# Patient Record
Sex: FEMALE | Race: BLACK OR AFRICAN AMERICAN | NOT HISPANIC OR LATINO | Employment: PART TIME | URBAN - METROPOLITAN AREA
[De-identification: names, ages, dates, MRNs, and addresses within clinical notes are randomized per-mention and may not be internally consistent; named-entity substitution may affect disease eponyms.]

---

## 2021-11-24 ENCOUNTER — OFFICE VISIT (OUTPATIENT)
Dept: OBSTETRICS AND GYNECOLOGY | Facility: CLINIC | Age: 31
End: 2021-11-24

## 2021-11-24 ENCOUNTER — PROCEDURE VISIT (OUTPATIENT)
Dept: OBSTETRICS AND GYNECOLOGY | Facility: CLINIC | Age: 31
End: 2021-11-24

## 2021-11-24 VITALS
WEIGHT: 166 LBS | SYSTOLIC BLOOD PRESSURE: 136 MMHG | BODY MASS INDEX: 28.34 KG/M2 | DIASTOLIC BLOOD PRESSURE: 74 MMHG | HEIGHT: 64 IN

## 2021-11-24 DIAGNOSIS — Z53.09: Primary | ICD-10-CM

## 2021-11-24 DIAGNOSIS — N75.0 BARTHOLIN'S CYST: Primary | ICD-10-CM

## 2021-11-24 PROCEDURE — 56420 INCISION AND DRAINAGE: ICD-10-PCS | Mod: S$GLB,,, | Performed by: NURSE PRACTITIONER

## 2021-11-24 PROCEDURE — 99499 UNLISTED E&M SERVICE: CPT | Mod: S$GLB,,, | Performed by: NURSE PRACTITIONER

## 2021-11-24 PROCEDURE — 56420 I&D BARTHOLINS GLAND ABSCESS: CPT | Mod: S$GLB,,, | Performed by: NURSE PRACTITIONER

## 2021-11-24 PROCEDURE — 99499 NO LOS: ICD-10-PCS | Mod: S$GLB,,, | Performed by: NURSE PRACTITIONER

## 2021-11-24 RX ORDER — CLINDAMYCIN HYDROCHLORIDE 300 MG/1
300 CAPSULE ORAL 2 TIMES DAILY
Qty: 14 CAPSULE | Refills: 0 | Status: SHIPPED | OUTPATIENT
Start: 2021-11-24 | End: 2022-05-26 | Stop reason: SDUPTHER

## 2021-12-14 ENCOUNTER — OFFICE VISIT (OUTPATIENT)
Dept: OBSTETRICS AND GYNECOLOGY | Facility: CLINIC | Age: 31
End: 2021-12-14

## 2021-12-14 VITALS
SYSTOLIC BLOOD PRESSURE: 100 MMHG | BODY MASS INDEX: 29.02 KG/M2 | DIASTOLIC BLOOD PRESSURE: 60 MMHG | HEIGHT: 64 IN | WEIGHT: 170 LBS

## 2021-12-14 DIAGNOSIS — Z00.00 WELLNESS EXAMINATION: Primary | ICD-10-CM

## 2021-12-14 DIAGNOSIS — Z11.3 SCREENING FOR STD (SEXUALLY TRANSMITTED DISEASE): ICD-10-CM

## 2021-12-14 PROCEDURE — 99395 PREV VISIT EST AGE 18-39: CPT | Mod: S$GLB,,, | Performed by: NURSE PRACTITIONER

## 2021-12-14 PROCEDURE — 88175 CYTOPATH C/V AUTO FLUID REDO: CPT | Performed by: NURSE PRACTITIONER

## 2021-12-14 PROCEDURE — 87591 N.GONORRHOEAE DNA AMP PROB: CPT | Performed by: NURSE PRACTITIONER

## 2021-12-14 PROCEDURE — 99395 PR PREVENTIVE VISIT,EST,18-39: ICD-10-PCS | Mod: S$GLB,,, | Performed by: NURSE PRACTITIONER

## 2021-12-14 PROCEDURE — 87491 CHLMYD TRACH DNA AMP PROBE: CPT | Performed by: NURSE PRACTITIONER

## 2021-12-14 RX ORDER — CLINDAMYCIN HYDROCHLORIDE 300 MG/1
300 CAPSULE ORAL 2 TIMES DAILY
Qty: 14 CAPSULE | Refills: 1 | Status: SHIPPED | OUTPATIENT
Start: 2021-12-14 | End: 2022-05-26 | Stop reason: SDUPTHER

## 2021-12-21 LAB
C TRACH DNA SPEC QL NAA+PROBE: NOT DETECTED
N GONORRHOEA DNA SPEC QL NAA+PROBE: NOT DETECTED

## 2021-12-22 LAB
FINAL PATHOLOGIC DIAGNOSIS: NORMAL
Lab: NORMAL

## 2021-12-28 ENCOUNTER — LAB VISIT (OUTPATIENT)
Dept: LAB | Facility: CLINIC | Age: 31
End: 2021-12-28

## 2021-12-28 DIAGNOSIS — Z00.00 WELLNESS EXAMINATION: ICD-10-CM

## 2021-12-28 DIAGNOSIS — Z11.3 SCREENING FOR STD (SEXUALLY TRANSMITTED DISEASE): ICD-10-CM

## 2021-12-28 LAB
CHOLEST SERPL-MCNC: 173 MG/DL (ref 120–199)
CHOLEST/HDLC SERPL: 3.4 {RATIO} (ref 2–5)
GLUCOSE SERPL-MCNC: 95 MG/DL (ref 70–110)
HDLC SERPL-MCNC: 51 MG/DL (ref 40–75)
HDLC SERPL: 29.5 % (ref 20–50)
LDLC SERPL CALC-MCNC: 113.6 MG/DL (ref 63–159)
NONHDLC SERPL-MCNC: 122 MG/DL
TRIGL SERPL-MCNC: 42 MG/DL (ref 30–150)

## 2021-12-28 PROCEDURE — 82947 ASSAY GLUCOSE BLOOD QUANT: CPT | Performed by: NURSE PRACTITIONER

## 2021-12-28 PROCEDURE — 87340 HEPATITIS B SURFACE AG IA: CPT | Performed by: NURSE PRACTITIONER

## 2021-12-28 PROCEDURE — 87389 HIV-1 AG W/HIV-1&-2 AB AG IA: CPT | Performed by: NURSE PRACTITIONER

## 2021-12-28 PROCEDURE — 36415 PR COLLECTION VENOUS BLOOD,VENIPUNCTURE: ICD-10-PCS | Mod: ,,, | Performed by: NURSE PRACTITIONER

## 2021-12-28 PROCEDURE — 36415 COLL VENOUS BLD VENIPUNCTURE: CPT | Mod: ,,, | Performed by: NURSE PRACTITIONER

## 2021-12-28 PROCEDURE — 86592 SYPHILIS TEST NON-TREP QUAL: CPT | Performed by: NURSE PRACTITIONER

## 2021-12-28 PROCEDURE — 80061 LIPID PANEL: CPT | Performed by: NURSE PRACTITIONER

## 2021-12-29 LAB
HBV SURFACE AG SERPL QL IA: NEGATIVE
HIV 1+2 AB+HIV1 P24 AG SERPL QL IA: NEGATIVE
RPR SER QL: NORMAL

## 2022-05-26 ENCOUNTER — OFFICE VISIT (OUTPATIENT)
Dept: OBSTETRICS AND GYNECOLOGY | Facility: CLINIC | Age: 32
End: 2022-05-26

## 2022-05-26 ENCOUNTER — TELEPHONE (OUTPATIENT)
Dept: OBSTETRICS AND GYNECOLOGY | Facility: CLINIC | Age: 32
End: 2022-05-26

## 2022-05-26 VITALS
DIASTOLIC BLOOD PRESSURE: 80 MMHG | WEIGHT: 160 LBS | HEIGHT: 64 IN | BODY MASS INDEX: 27.31 KG/M2 | SYSTOLIC BLOOD PRESSURE: 140 MMHG

## 2022-05-26 DIAGNOSIS — N75.1 ABSCESS OF LEFT BARTHOLIN'S GLAND: Primary | ICD-10-CM

## 2022-05-26 PROCEDURE — 99213 PR OFFICE/OUTPT VISIT, EST, LEVL III, 20-29 MIN: ICD-10-PCS | Mod: S$GLB,,, | Performed by: NURSE PRACTITIONER

## 2022-05-26 PROCEDURE — 99213 OFFICE O/P EST LOW 20 MIN: CPT | Mod: S$GLB,,, | Performed by: NURSE PRACTITIONER

## 2022-05-26 RX ORDER — CLINDAMYCIN HYDROCHLORIDE 300 MG/1
300 CAPSULE ORAL 2 TIMES DAILY
Qty: 14 CAPSULE | Refills: 0 | Status: SHIPPED | OUTPATIENT
Start: 2022-05-26 | End: 2023-01-18 | Stop reason: SDUPTHER

## 2022-05-26 NOTE — PROGRESS NOTES
Subjective:       Patient ID: Aparna Da Silva is a 31 y.o. female.    Chief Complaint: Cyst      HPI   Patient presents today for left labial Bartholin's gland cyst.  These are recurring for her.  Clindamycin usually helps and resolve the issue.  On several occasions I had to do an incision and drainage for relief.  Today patient states the left labia enlarged rapidly today.  The area does appear to be cellulitic.  Patient denies any fevers today.  No distinct cyst or nodule noted in order to drain today.  I have spoke with the patient in the past regarding possible surgical evaluation for marsupialization.  Patient does agree now for evaluation with MD  Review of Systems   Constitutional: Negative for appetite change, chills, fatigue and fever.   HENT: Negative for nasal congestion, rhinorrhea and sore throat.    Respiratory: Negative for cough, chest tightness, shortness of breath and wheezing.    Cardiovascular: Negative for chest pain, palpitations and leg swelling.   Gastrointestinal: Negative for abdominal distention, abdominal pain, blood in stool, constipation, diarrhea, nausea and vomiting.   Endocrine: Negative for cold intolerance, heat intolerance and polyuria.   Genitourinary: Positive for vaginal pain. Negative for bladder incontinence, dyspareunia, dysuria, flank pain, hot flashes, menstrual irregularity, pelvic pain, urgency, vaginal bleeding and vaginal discharge.   Musculoskeletal: Negative for arthralgias, myalgias and neck pain.   Integumentary:  Negative for rash, breast mass, breast discharge and breast tenderness.   Neurological: Negative for dizziness, syncope and headaches.   Psychiatric/Behavioral: Negative for agitation, sleep disturbance and suicidal ideas.   Breast: Negative for mass and tenderness        Objective:      Physical Exam  Exam conducted with a chaperone present.   Constitutional:       General: She is not in acute distress.     Appearance: Normal appearance. She is not  ill-appearing.   HENT:      Head: Normocephalic.      Nose: Nose normal.   Pulmonary:      Effort: Pulmonary effort is normal. No respiratory distress.   Abdominal:      General: Bowel sounds are normal. There is no distension.      Palpations: Abdomen is soft. There is no mass.      Tenderness: There is no abdominal tenderness.   Genitourinary:      Musculoskeletal:      Right lower leg: No edema.      Left lower leg: No edema.   Skin:     General: Skin is warm and dry.      Findings: No lesion.   Neurological:      General: No focal deficit present.      Mental Status: She is alert and oriented to person, place, and time.      Motor: No weakness.      Gait: Gait normal.   Psychiatric:         Mood and Affect: Mood normal.         Behavior: Behavior normal.         Thought Content: Thought content normal.         Judgment: Judgment normal.         Assessment:       Problem List Items Addressed This Visit    None     Visit Diagnoses     Abscess of left Bartholin's gland    -  Primary          Plan:       Abscess of left Bartholin's gland    Other orders  -     clindamycin (CLEOCIN) 300 MG capsule; Take 1 capsule (300 mg total) by mouth 2 (two) times daily.  Dispense: 14 capsule; Refill: 0    Clindamycin today.  Report to ER if fever  Walked continue warm Sitz baths  Follow-up early next week with MD.

## 2022-05-26 NOTE — LETTER
May 26, 2022      [facility]  [address] [address] [place] [address]  Phone: [phone]  Fax: [phone]       Patient: Aparna Da Silva   YOB: 1990  Date of Visit: 05/26/2022    To Whom It May Concern:    Yashira Da Silva  was at [facility] on 05/26/2022. The patient may return to work/school on 5/31/2022 with no restrictions. If you have any questions or concerns, or if I can be of further assistance, please do not hesitate to contact me.    Sincerely,    JAMAAL Us

## 2022-05-26 NOTE — TELEPHONE ENCOUNTER
Per Estela/Dr. Martel, Ultram 50mg 1 PO BID #14, 0RF called in to patient's pharmacy. Pt notified and LEONIDAS

## 2022-05-30 ENCOUNTER — OFFICE VISIT (OUTPATIENT)
Dept: OBSTETRICS AND GYNECOLOGY | Facility: CLINIC | Age: 32
End: 2022-05-30

## 2022-05-30 VITALS
DIASTOLIC BLOOD PRESSURE: 64 MMHG | WEIGHT: 160.81 LBS | BODY MASS INDEX: 27.45 KG/M2 | HEIGHT: 64 IN | SYSTOLIC BLOOD PRESSURE: 112 MMHG

## 2022-05-30 DIAGNOSIS — N75.0 BARTHOLIN'S CYST: Primary | ICD-10-CM

## 2022-05-30 DIAGNOSIS — N75.1 ABSCESS OF LEFT BARTHOLIN'S GLAND: ICD-10-CM

## 2022-05-30 PROCEDURE — 99213 PR OFFICE/OUTPT VISIT, EST, LEVL III, 20-29 MIN: ICD-10-PCS | Mod: S$GLB,,, | Performed by: OBSTETRICS & GYNECOLOGY

## 2022-05-30 PROCEDURE — 99213 OFFICE O/P EST LOW 20 MIN: CPT | Mod: S$GLB,,, | Performed by: OBSTETRICS & GYNECOLOGY

## 2022-05-30 RX ORDER — TRAMADOL HYDROCHLORIDE 50 MG/1
50 TABLET ORAL 2 TIMES DAILY
COMMUNITY
Start: 2022-05-26

## 2022-05-30 RX ORDER — ERYTHROMYCIN 5 MG/G
OINTMENT OPHTHALMIC
COMMUNITY
Start: 2022-02-28

## 2022-05-30 RX ORDER — KETOROLAC TROMETHAMINE 10 MG/1
10 TABLET, FILM COATED ORAL EVERY 6 HOURS PRN
COMMUNITY
Start: 2022-02-28

## 2022-05-30 NOTE — PROGRESS NOTES
Patient ID: Aparna Da Silva is a 31 y.o. female.    Chief Complaint:  Follow-up (Pt is here for a follow up on abscess Barthlin's gland)      History of Present Illness  HPI  Or patient recently diagnosed with right-sided Bartholin's cyst causing significant cellulitis of her right labia.  She was recently started on clindamycin.  Patient states the cellulitis has improved as well has the pain however she still has significant edema and cyst on her right labia.  She states she has a Bartholin's cyst on her left labia as well however is not painful.  She has had recurrent Bartholin's cyst abscess monthly for the last 6 months.  There was a time when she went several months without an infection however the cyst remained.  She has gone through multiple I&Ds and antibiotics and now desires further treatment.  She is here for follow-up as well for her cellulitis of the most recent Bartholin's cyst abscess.  She denies any nausea or vomiting.  She states she is taking her medications without complication.    GYN & OB History  Patient's last menstrual period was 2022 (exact date).   Date of Last Pap: 2021    History reviewed. No pertinent past medical history.  Past Surgical History:   Procedure Laterality Date    WISDOM TOOTH EXTRACTION       Review of patient's allergies indicates:  No Known Allergies  Current Outpatient Medications on File Prior to Visit   Medication Sig Dispense Refill    clindamycin (CLEOCIN) 300 MG capsule Take 1 capsule (300 mg total) by mouth 2 (two) times daily. 14 capsule 0    erythromycin (ROMYCIN) ophthalmic ointment Place a 1/2 inch ribbon of ointment into the lower eyelid.      ketorolac (TORADOL) 10 mg tablet Take 10 mg by mouth every 6 (six) hours as needed.      traMADoL (ULTRAM) 50 mg tablet Take 50 mg by mouth 2 (two) times daily.       No current facility-administered medications on file prior to visit.       OB History    Para Term  AB Living   3 1 1   " 2 1   SAB IAB Ectopic Multiple Live Births   1       1      # Outcome Date GA Lbr Derick/2nd Weight Sex Delivery Anes PTL Lv   3 Term 12/08/11   3.345 kg (7 lb 6 oz) F Vag-Spont EPI  DENISA      Complications: Shoulder Dystocia   2 AB            1 SAB                Review of Systems  Review of Systems   Constitutional: Negative for fatigue, fever and unexpected weight change.   HENT: Negative for nasal congestion.    Respiratory: Negative for cough and shortness of breath.    Cardiovascular: Negative for chest pain, palpitations and leg swelling.   Gastrointestinal: Negative for abdominal pain, constipation, diarrhea, nausea, vomiting and reflux.   Endocrine: Negative for diabetes, hair loss and hot flashes.   Genitourinary: Positive for vaginal pain. Negative for bladder incontinence, decreased libido, dysmenorrhea, dyspareunia, dysuria, hot flashes, menorrhagia, menstrual problem, pelvic pain, vaginal discharge and vaginal dryness.   Musculoskeletal: Negative for arthralgias, back pain and myalgias.   Integumentary:  Negative for rash, acne, hair changes, mole/lesion, breast mass, nipple discharge, breast skin changes and breast tenderness.   Neurological: Negative for seizures, syncope and headaches.   Hematological: Negative for adenopathy. Does not bruise/bleed easily.   Psychiatric/Behavioral: Negative for depression and sleep disturbance. The patient is not nervous/anxious.    Breast: Negative for breast self exam, lump, mass, mastodynia, nipple discharge, skin changes and tenderness              Objective:   /64   Ht 5' 4" (1.626 m)   Wt 72.9 kg (160 lb 12.8 oz)   LMP 05/08/2022 (Exact Date)   BMI 27.60 kg/m²        Physical Exam:   Constitutional: She is oriented to person, place, and time. She appears well-developed and well-nourished. No distress.    HENT:   Head: Normocephalic and atraumatic.   Nose: Nose normal.            Abdominal: Soft. She exhibits no distension and no mass. There is no " abdominal tenderness.             Musculoskeletal: Normal range of motion and moves all extremeties. No edema.       Neurological: She is alert and oriented to person, place, and time. No cranial nerve deficit.     Psychiatric: She has a normal mood and affect. Her behavior is normal. Judgment and thought content normal.   :  Mild edema of the right labia minora and majora.  Significant firmness noted in the Bartholin's area consistent with Bartholin's cyst.  Left-sided Bartholin's cyst noted.  Not tender to palpation or infected.  Right side is tender to palpation.  No discharge.        Assessment:      1. Bartholin's cyst    2. Abscess of left Bartholin's gland          Plan:           patient with recurrent Bartholin's cyst and Bartholin cyst abscesses.  Current cellulitis and abscess reports improving with antibiotics.  Patient states the recurrent abscesses and cysts are affecting her daily life and she desires further treatment.  Discussed options of suppression dosing versus marsupialization and patient elects for surgical treatment with bilateral Bartholin's cyst marsupialization.  Will schedule and have patient return to preop.  Patient agrees expressed understanding.

## 2022-06-01 ENCOUNTER — TELEPHONE (OUTPATIENT)
Dept: OBSTETRICS AND GYNECOLOGY | Facility: CLINIC | Age: 32
End: 2022-06-01

## 2022-06-01 NOTE — TELEPHONE ENCOUNTER
Called Pt to discuss surgery. Pt will be contacted by [employer] for estimates. She will call me once this is done so she can be given information for [employer] contacts to discuss their self pay prices. Pt aware once she has done this and is ready to proceed with scheduling to let me know. Pt verbalized understanding.

## 2023-01-18 ENCOUNTER — OFFICE VISIT (OUTPATIENT)
Dept: OBSTETRICS AND GYNECOLOGY | Facility: CLINIC | Age: 33
End: 2023-01-18
Payer: COMMERCIAL

## 2023-01-18 ENCOUNTER — TELEPHONE (OUTPATIENT)
Dept: OBSTETRICS AND GYNECOLOGY | Facility: CLINIC | Age: 33
End: 2023-01-18

## 2023-01-18 VITALS
DIASTOLIC BLOOD PRESSURE: 56 MMHG | WEIGHT: 155 LBS | HEIGHT: 65 IN | SYSTOLIC BLOOD PRESSURE: 102 MMHG | BODY MASS INDEX: 25.83 KG/M2

## 2023-01-18 DIAGNOSIS — N75.0 BARTHOLIN'S CYST: Primary | ICD-10-CM

## 2023-01-18 PROCEDURE — 56420 I&D BARTHOLINS GLAND ABSCESS: CPT | Mod: S$GLB,,, | Performed by: NURSE PRACTITIONER

## 2023-01-18 PROCEDURE — 56420 INCISION AND DRAINAGE: ICD-10-PCS | Mod: S$GLB,,, | Performed by: NURSE PRACTITIONER

## 2023-01-18 RX ORDER — CLINDAMYCIN HYDROCHLORIDE 300 MG/1
300 CAPSULE ORAL 2 TIMES DAILY
Qty: 14 CAPSULE | Refills: 0 | Status: SHIPPED | OUTPATIENT
Start: 2023-01-18

## 2023-01-18 RX ORDER — AMOXICILLIN 875 MG/1
875 TABLET, FILM COATED ORAL
COMMUNITY

## 2023-01-18 NOTE — PROCEDURES
INCISION AND DRAINAGE    Date/Time: 1/18/2023 10:30 AM  Performed by: Estela Flores NP  Authorized by: Estela Flores NP     Consent Done?:  Yes (Written)    Type:  Cyst  Body area:  Anogenital  Location details:  Bartholin's gland  Anesthesia:  Local infiltration  Local anesthetic: Lidocaine 1% with epinephrine  Scalpel size:  11  Incision type:  Single straight  Incision depth: subcutaneous    Complexity:  Simple  Drainage:  Purulent and bloody  Drainage amount:  Moderate  Wound treatment:  Wound left open  Packing material:  None  Patient tolerance:  Patient tolerated the procedure well with no immediate complications    Antibiotics prescribed.  Follow-up this Friday for consideration of marsupialization

## 2023-01-18 NOTE — PROGRESS NOTES
Subjective:       Patient ID: Aparna Da Silva is a 32 y.o. female.    Chief Complaint: Cyst      HPI     Review of Systems      Objective:      Physical Exam    Assessment:       Problem List Items Addressed This Visit    None      Plan:       There are no diagnoses linked to this encounter.

## 2023-01-20 ENCOUNTER — OFFICE VISIT (OUTPATIENT)
Dept: OBSTETRICS AND GYNECOLOGY | Facility: CLINIC | Age: 33
End: 2023-01-20
Payer: COMMERCIAL

## 2023-01-20 VITALS
HEIGHT: 64 IN | SYSTOLIC BLOOD PRESSURE: 112 MMHG | BODY MASS INDEX: 26.53 KG/M2 | WEIGHT: 155.38 LBS | DIASTOLIC BLOOD PRESSURE: 58 MMHG

## 2023-01-20 DIAGNOSIS — N75.0 BARTHOLIN'S CYST: ICD-10-CM

## 2023-01-20 DIAGNOSIS — N75.1 ABSCESS OF RIGHT BARTHOLIN'S GLAND: Primary | ICD-10-CM

## 2023-01-20 PROCEDURE — 99213 OFFICE O/P EST LOW 20 MIN: CPT | Mod: S$GLB,,, | Performed by: OBSTETRICS & GYNECOLOGY

## 2023-01-20 PROCEDURE — 99213 PR OFFICE/OUTPT VISIT, EST, LEVL III, 20-29 MIN: ICD-10-PCS | Mod: S$GLB,,, | Performed by: OBSTETRICS & GYNECOLOGY

## 2023-01-20 NOTE — PROGRESS NOTES
Patient ID: Aparna Da Silva is a 32 y.o. female.    Chief Complaint:  Consult (Pt is here for a Bartholin's cyst surgical consult visit. Pt was referred by NP Estela Mendoza.)      History of Present Illness  HPI  Patient recently treated for Bartholin's gland abscess.  She underwent I&D recently and states she has had multiple Bartholin's gland abscesses on her right side.  She states they occur once every 2-3 months.  She is had greater than 5 of these occur.  She also has had a Bartholin's gland abscess on her left side.  She still has a cyst on the left side however is no pain.  After her recent incision and drainage her pain is doing much better and it is draining.  Has gone down in size.  She is here to discuss potential long-term treatment due to her recurrent Bartholin's gland abscesses.  She denies any history of diabetes or other medical problems.  She is doing well otherwise.  She states typically she tries antibiotics 1st they do not resolve and so she has to undergo an incision and drainage to relieve the pain from her abscesses.  Patient states she is currently on a very heavy day of her menstrual cycle and would like to discuss potential long-term treatment.    GYN & OB History  Patient's last menstrual period was 01/19/2023.   Date of Last Pap: 12/22/2021    History reviewed. No pertinent past medical history.  Past Surgical History:   Procedure Laterality Date    WISDOM TOOTH EXTRACTION       Review of patient's allergies indicates:  No Known Allergies  Current Outpatient Medications on File Prior to Visit   Medication Sig Dispense Refill    amoxicillin (AMOXIL) 875 MG tablet Take 875 mg by mouth every 12 (twelve) hours.      clindamycin (CLEOCIN) 300 MG capsule Take 1 capsule (300 mg total) by mouth 2 (two) times daily. 14 capsule 0    erythromycin (ROMYCIN) ophthalmic ointment Place a 1/2 inch ribbon of ointment into the lower eyelid.      ketorolac (TORADOL) 10 mg tablet Take 10 mg by mouth  "every 6 (six) hours as needed.      traMADoL (ULTRAM) 50 mg tablet Take 50 mg by mouth 2 (two) times daily.       No current facility-administered medications on file prior to visit.       OB History    Para Term  AB Living   3 1 1   2 1   SAB IAB Ectopic Multiple Live Births   1       1      # Outcome Date GA Lbr Derick/2nd Weight Sex Delivery Anes PTL Lv   3 Term 11   3.345 kg (7 lb 6 oz) F Vag-Spont EPI  DENISA      Complications: Shoulder Dystocia   2 AB            1 SAB                Review of Systems  Review of Systems   Constitutional:  Negative for fatigue, fever and unexpected weight change.   HENT:  Negative for nasal congestion.    Respiratory:  Negative for cough and shortness of breath.    Cardiovascular:  Negative for chest pain, palpitations and leg swelling.   Gastrointestinal:  Negative for abdominal pain, constipation, diarrhea, nausea, vomiting and reflux.   Endocrine: Negative for diabetes, hair loss and hot flashes.   Genitourinary:  Positive for genital sores. Negative for bladder incontinence, decreased libido, dysmenorrhea, dyspareunia, dysuria, hot flashes, menorrhagia, menstrual problem, pelvic pain, vaginal discharge and vaginal dryness.   Musculoskeletal:  Negative for arthralgias, back pain and myalgias.   Integumentary:  Negative for rash, acne, hair changes, mole/lesion, breast mass, nipple discharge, breast skin changes and breast tenderness.   Neurological:  Negative for seizures, syncope and headaches.   Hematological:  Negative for adenopathy. Does not bruise/bleed easily.   Psychiatric/Behavioral:  Negative for depression and sleep disturbance. The patient is not nervous/anxious.    Breast: Negative for breast self exam, lump, mass, mastodynia, nipple discharge, skin changes and tenderness            Objective:   BP (!) 112/58   Ht 5' 4" (1.626 m)   Wt 70.5 kg (155 lb 6.4 oz)   LMP 2023   BMI 26.67 kg/m²        Physical Exam:   Constitutional: She is " oriented to person, place, and time. She appears well-developed and well-nourished. No distress.    HENT:   Head: Normocephalic and atraumatic.   Nose: Nose normal.            Abdominal: Soft. She exhibits no distension and no mass. There is no abdominal tenderness.             Musculoskeletal: Normal range of motion and moves all extremeties. No edema.       Neurological: She is alert and oriented to person, place, and time. No cranial nerve deficit.     Psychiatric: She has a normal mood and affect. Her behavior is normal. Judgment and thought content normal.          Assessment:      1. Abscess of right Bartholin's gland    2. Bartholin's cyst          Plan:      Discussed current treatment a Bartholin's gland abscess since pain is improving and cyst is going down is working.  Discussed long-term treatment regarding marsupialization versus excision Bartholin's gland cyst.  Patient would like to move forward with excision of the cyst.  She would like removal of bilateral cysts to decrease the chance of recurrence.  Risks/benefits/alternatives of procedure were discussed

## 2023-01-23 ENCOUNTER — OFFICE VISIT (OUTPATIENT)
Dept: OBSTETRICS AND GYNECOLOGY | Facility: CLINIC | Age: 33
End: 2023-01-23
Payer: COMMERCIAL

## 2023-01-23 ENCOUNTER — TELEPHONE (OUTPATIENT)
Dept: OBSTETRICS AND GYNECOLOGY | Facility: CLINIC | Age: 33
End: 2023-01-23
Payer: COMMERCIAL

## 2023-01-23 VITALS
BODY MASS INDEX: 25.83 KG/M2 | WEIGHT: 155 LBS | HEIGHT: 65 IN | SYSTOLIC BLOOD PRESSURE: 108 MMHG | DIASTOLIC BLOOD PRESSURE: 66 MMHG

## 2023-01-23 DIAGNOSIS — N75.1 ABSCESS OF RIGHT BARTHOLIN'S GLAND: Primary | ICD-10-CM

## 2023-01-23 PROCEDURE — 99024 PR POST-OP FOLLOW-UP VISIT: ICD-10-PCS | Mod: S$GLB,,, | Performed by: NURSE PRACTITIONER

## 2023-01-23 PROCEDURE — 99024 POSTOP FOLLOW-UP VISIT: CPT | Mod: S$GLB,,, | Performed by: NURSE PRACTITIONER

## 2023-01-23 NOTE — PROGRESS NOTES
Subjective:       Patient ID: Aparna Da Silva is a 32 y.o. female.    Chief Complaint: Follow-up (Incision and Drainage follow up)      HPI   Presents today for follow-up of Bartholin gland cyst status post I and D last week.  She denies any fever.  She was evaluated by Dr. Martel for marsupialization of both glands potentially.  Patient is scheduled for procedure.  Review of Systems   Constitutional:  Negative for appetite change, chills, fatigue and fever.   HENT:  Negative for nasal congestion, rhinorrhea and sore throat.    Respiratory:  Negative for cough, chest tightness, shortness of breath and wheezing.    Cardiovascular:  Negative for chest pain, palpitations and leg swelling.   Gastrointestinal:  Negative for abdominal distention, abdominal pain, blood in stool, constipation, diarrhea, nausea and vomiting.   Endocrine: Negative for cold intolerance, heat intolerance and polyuria.   Genitourinary:  Negative for bladder incontinence, dyspareunia, dysuria, flank pain, hot flashes, menstrual irregularity, pelvic pain, urgency, vaginal bleeding and vaginal discharge.        Status post Bartholin cyst I&D   Musculoskeletal:  Negative for arthralgias, myalgias and neck pain.   Integumentary:  Negative for rash, breast mass, breast discharge and breast tenderness.   Neurological:  Negative for dizziness, syncope and headaches.   Psychiatric/Behavioral:  Negative for agitation, sleep disturbance and suicidal ideas.    Breast: Negative for mass and tenderness      Objective:      Physical Exam  Exam conducted with a chaperone present.   Constitutional:       General: She is not in acute distress.     Appearance: Normal appearance. She is not ill-appearing.   HENT:      Nose: Nose normal.   Pulmonary:      Effort: Pulmonary effort is normal. No respiratory distress.   Genitourinary:      Skin:     General: Skin is warm and dry.      Findings: No lesion or rash.   Neurological:      General: No focal deficit present.       Mental Status: She is alert and oriented to person, place, and time.   Psychiatric:         Mood and Affect: Mood normal.         Behavior: Behavior normal. Behavior is cooperative.         Thought Content: Thought content normal.         Judgment: Judgment normal.       Assessment:       Problem List Items Addressed This Visit    None  Visit Diagnoses       Abscess of right Bartholin's gland    -  Primary            Plan:       Abscess of right Bartholin's gland      Scheduled for marsupialization procedure

## 2023-02-15 ENCOUNTER — OUTSIDE PLACE OF SERVICE (OUTPATIENT)
Dept: OBSTETRICS AND GYNECOLOGY | Facility: CLINIC | Age: 33
End: 2023-02-15
Payer: COMMERCIAL

## 2023-02-15 PROCEDURE — 56740 PR EXCIS BARTHOLIN GLAND/CYST: ICD-10-PCS | Mod: 50,,, | Performed by: OBSTETRICS & GYNECOLOGY

## 2023-02-15 PROCEDURE — 56740 EXC BARTHOLINS GLAND/CYST: CPT | Mod: 50,,, | Performed by: OBSTETRICS & GYNECOLOGY

## 2023-02-27 ENCOUNTER — PATIENT MESSAGE (OUTPATIENT)
Dept: OBSTETRICS AND GYNECOLOGY | Facility: CLINIC | Age: 33
End: 2023-02-27
Payer: COMMERCIAL

## 2023-02-27 ENCOUNTER — TELEPHONE (OUTPATIENT)
Dept: OBSTETRICS AND GYNECOLOGY | Facility: CLINIC | Age: 33
End: 2023-02-27
Payer: COMMERCIAL

## 2023-02-27 NOTE — TELEPHONE ENCOUNTER
Called Pt, NA.Unable to LVM. Sent [employer] message for Pt to call clinic to schedule appt. With Dr. Martel            ----- Message from Alyson Martinez sent at 2/27/2023  9:29 AM CST -----  Contact: PT  Type:  Sooner Appointment Request    Caller is requesting a sooner appointment.  Caller declined first available appointment listed below.  Caller will not accept being placed on the waitlist and is requesting a message be sent to doctor.    Name of Caller:  Aparna/ PT  When is the first available appointment?  3/2/23  Symptoms:  F/UP from Surgery, Still having pain  Best Call Back Number:  [phone]  Additional Information:  PT is asking to be seen this week  anytime tomorrow 2/28/23 or 3/1/23 she is off work both days

## 2023-03-01 ENCOUNTER — OFFICE VISIT (OUTPATIENT)
Dept: OBSTETRICS AND GYNECOLOGY | Facility: CLINIC | Age: 33
End: 2023-03-01
Payer: COMMERCIAL

## 2023-03-01 VITALS
BODY MASS INDEX: 26.74 KG/M2 | HEIGHT: 64 IN | DIASTOLIC BLOOD PRESSURE: 68 MMHG | SYSTOLIC BLOOD PRESSURE: 122 MMHG | WEIGHT: 156.63 LBS

## 2023-03-01 DIAGNOSIS — Z09 EXAMINATION FOLLOWING SURGERY: Primary | ICD-10-CM

## 2023-03-01 PROCEDURE — 99024 PR POST-OP FOLLOW-UP VISIT: ICD-10-PCS | Mod: S$GLB,,, | Performed by: OBSTETRICS & GYNECOLOGY

## 2023-03-01 PROCEDURE — 99024 POSTOP FOLLOW-UP VISIT: CPT | Mod: S$GLB,,, | Performed by: OBSTETRICS & GYNECOLOGY

## 2023-03-01 RX ORDER — HYDROCODONE BITARTRATE AND ACETAMINOPHEN 5; 325 MG/1; MG/1
1 TABLET ORAL EVERY 6 HOURS PRN
Qty: 15 TABLET | Refills: 0 | Status: SHIPPED | OUTPATIENT
Start: 2023-03-01

## 2023-03-01 NOTE — PROGRESS NOTES
"Chief Complaint   Patient presents with    Post-op Evaluation     Pt is here for a post op Excision of Bilateral Bartholin Gland cyst on 2/15/23        History of Present Illness:   Pateint presents today  2 weeks postop, status post Excision of Bilateral Bartholin Gland cyst, with complaint of vaginal pain. Pain is not well controlled.  Medications being used: Tramadol.    Pathology: Discussion of test results with performing physician    Past medical and surgical history reviewed.   I have reviewed the patient's medical history in detail and updated the computerized patient record.    Physical exam:  /68   Ht 5' 4" (1.626 m)   Wt 71 kg (156 lb 9.6 oz)   LMP  (LMP Unknown)   BMI 26.88 kg/m²   General:  Well-developed, well-nourished female in no acute distress  HEENT:  Normocephalic, atraumatic, extraocular muscles intact, moist mucous membranes  Breasts: deferred  Abdominal:  Soft, nontender, nondistended, incision clean dry and intact.   Extremities:  Warm, dry, no clubbing/cyanosis/edema  Neurological:  For cranial nerves 2-12 grossly intact   Dermatologic:  No rashes/lesions/bruising  Psychiatric:  Appropriate mood, affect, speech    Assessment:  Status post  removal of bilateral Bartholin's gland cysts    Plan:  Doing well.  Routine postop care.  Cleared from a surgery standpoint.  Return with questions.  Continue pain medication as needed.      "

## 2023-07-05 ENCOUNTER — PATIENT MESSAGE (OUTPATIENT)
Dept: RESEARCH | Facility: HOSPITAL | Age: 33
End: 2023-07-05
Payer: COMMERCIAL